# Patient Record
(demographics unavailable — no encounter records)

---

## 2025-01-17 NOTE — HISTORY OF PRESENT ILLNESS
[FreeTextEntry1] : Was a pleasure to see  once again after the 15-year hiatus.  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 Preciado with a diagnosis of prostatitis.  The patient was not responding to antibiotic therapy.  At that time he was experiencing intermittent episodes of dysuria.  Pelvic floor examination showed significant trigger points along the puborectalis musculature.  No history of documented UTI.  The patient was ultimately counseled in techniques of pelvic floor relaxation after which symptoms spontaneously abated and he has been symptom-free since that time. The patient tells me that he was  about 2 years ago and has become more sexually active.  He has not experienced any urinary tract infections or STIs but feels that the increased amount of sexual activity has produced increased pelvic and primarily suprapubic discomfort that is not appear to be related to bladder filling and emptying.  The patient has no irritative or obstructive voiding symptoms.  He has no significant complaints of dysuria.  The patient initially saw Dr. Preciado who performed extensive workup on the patient showing a low PS (0.72 NG/CC) A, slight elevation of serum creatinine, 1.23 mg/dL (by the patient's report, chronically elevated at that level), negative urinalysis negative urine cultures negative urine cytology.  Prostate ultrasound showed prostate volume 13.8 cc.  Multiple hyperdense echoes suggestive of calculi. The patient's past medical history past surgical history and review of systems were reviewed today and can be found in the patient's electronic medical record. Physical examination shows the patient to be alert oriented x 3 neuro grossly intact normal gait. Abdominal exam showed no distention scattered tympany and significant tenderness over the inferior aspect of the rectus musculature just to the right and left of midline.  No mass lesions.  No bladder fullness.  Tenderness appeared to be relatively superficial and muscle based.  No tenderness of pubic symphysis.  Genital examination without cutaneous lesions.  Phallus uncircumcised.  No urethral discharge. Both testes approximately 18 cc no masses nontender normal cord structures.  Perineum nontender nonindurated.  Digital rectal examination mild increased sphincter tone as well as tone of levator plate.  No significant trigger points identified.  Prostate 20 cc nontender no regions of induration. In summary, patient with history of pelvic floor related pain now presents with suprapubic pain which appears to be musculoskeletal in origin.  Certainly may be related to increased level of sexual activity and basic techniques of pelvic floor relaxation once again discussed with patient.  Although nonsteroidal anti-inflammatories might be of help, I did not suggest chronic use but did suggest conservative measures such as warm soaks and avoidance of exercises and activities that might exacerbate current tenderness.  We discussed possible role for physical therapy evaluation and even discussed conservative therapy such as TENS.  It should be understood that at this time, patient has no symptoms at all and has primarily made this appointment to determine best course of action should symptoms recur.  The patient be returning on a as needed basis and will follow-up with Dr. Preciado for his routine urological care.

## 2025-01-17 NOTE — PHYSICAL EXAM
[Chaperone Present] : A chaperone was present in the examining room during all aspects of the physical examination [FreeTextEntry2] : Itzzy